# Patient Record
Sex: MALE | Race: ASIAN | Employment: UNEMPLOYED | URBAN - METROPOLITAN AREA
[De-identification: names, ages, dates, MRNs, and addresses within clinical notes are randomized per-mention and may not be internally consistent; named-entity substitution may affect disease eponyms.]

---

## 2024-07-01 ENCOUNTER — HOSPITAL ENCOUNTER (OUTPATIENT)
Age: 24
Discharge: HOME | End: 2024-07-01

## 2024-07-01 ENCOUNTER — OFFICE VISIT (OUTPATIENT)
Age: 24
End: 2024-07-01

## 2024-07-01 ENCOUNTER — HOSPITAL ENCOUNTER (OUTPATIENT)
Dept: GENERAL RADIOLOGY | Age: 24
Discharge: HOME | End: 2024-07-01

## 2024-07-01 VITALS
RESPIRATION RATE: 18 BRPM | WEIGHT: 140 LBS | SYSTOLIC BLOOD PRESSURE: 137 MMHG | HEIGHT: 68 IN | HEART RATE: 66 BPM | OXYGEN SATURATION: 98 % | TEMPERATURE: 98.9 F | BODY MASS INDEX: 21.22 KG/M2 | DIASTOLIC BLOOD PRESSURE: 83 MMHG

## 2024-07-01 DIAGNOSIS — R07.89 RIGHT-SIDED CHEST WALL PAIN: ICD-10-CM

## 2024-07-01 DIAGNOSIS — R07.89 RIGHT-SIDED CHEST WALL PAIN: Primary | ICD-10-CM

## 2024-07-01 PROCEDURE — 71046 X-RAY EXAM CHEST 2 VIEWS: CPT

## 2024-07-01 NOTE — PROGRESS NOTES
Mickey Jacobsen (:  2000) is a 24 y.o. male,New patient, here for evaluation of the following chief complaint(s):  Other (The patient is stating that he took antibiotics in [place] that fixed and infection that he has inside his body called pur??? Unknown if this is what it is called )      ASSESSMENT/PLAN:  Visit Diagnoses and Associated Orders       Right-sided chest wall pain    -  Primary    XR CHEST (2 VW) [75911 CPT(R)]   - Future Order    Stacy Burks, [place], [facility] [[id number] Custom]                  Unclear what is cause of sensation in right upper chest.  Starting with chest Xray today.  Will call with reading from radiology once received.  Continue to rest and stay hydrated.  Return if symptoms persist or change.  Proceed to hospital for evaluation if any worsening symptoms or concerns.      SUBJECTIVE/OBJECTIVE:    Mickey here with brother with c/o of sensation in left upper back.  Reports previous \"pus\" in this same area that required antibiotic treatment in [place].  Requesting antibiotic course today.  Denies previous drainage of pus.  States the antibiotic is only thing that helped.  Admits there is no specific, obvious swelling.  He feels it deep inside the back/chest.  Denies fever or chills.  Denies coughing.  Denies shortness of breath.  Denies known injury or trauma.      History provided by:  Relative and patient   used: No (Declined  prefers brother present to help with communication)        ROS: See HPI       Vitals:    24 1350   BP: 137/83   Site: Left Upper Arm   Position: Sitting   Cuff Size: Medium Adult   Pulse: 66   Resp: 18   Temp: 98.9 °F (37.2 °C)   SpO2: 98%   Weight: 63.5 kg (140 lb)   Height: 1.727 m (5' 8\")       No results found for this visit on 24.     Physical Exam  Vitals and nursing note reviewed.   Constitutional:       General: He is not in acute distress.     Appearance: He is not diaphoretic.

## 2024-07-01 NOTE — PATIENT INSTRUCTIONS
Unclear what is cause of sensation in right upper chest.  Starting with chest Xray today.  Will call with reading from radiology once received.  Continue to rest and stay hydrated.  Return if symptoms persist or change.  Proceed to hospital for evaluation if any worsening symptoms or concerns.    
Statement Selected

## 2024-07-04 ENCOUNTER — TELEPHONE (OUTPATIENT)
Age: 24
End: 2024-07-04

## 2024-07-04 NOTE — TELEPHONE ENCOUNTER
Contacted Pt to discuss chest xray.  Spoke with brother with Pt nearby to help with translation.  Chest xray was reported normal by radiology.  Pt reports no changes.  Did request antibiotic to see if this helps but this clinician declines without clear diagnosis that would indicate treatment.  Recommend watchful waiting.  Did refer to PCP at our visit and recommend establishing care for further workup and evaluation.  No further questions.

## 2024-08-09 ENCOUNTER — TELEPHONE (OUTPATIENT)
Dept: PRIMARY CARE CLINIC | Age: 24
End: 2024-08-09

## 2024-08-09 NOTE — TELEPHONE ENCOUNTER
We received a referral from a [facility]. I attempted to call him to set up a new patient appointment with Dr. Parra and a male answered the phone and stated that it was the wrong number.